# Patient Record
Sex: MALE | Race: WHITE | URBAN - METROPOLITAN AREA
[De-identification: names, ages, dates, MRNs, and addresses within clinical notes are randomized per-mention and may not be internally consistent; named-entity substitution may affect disease eponyms.]

---

## 2018-05-28 ENCOUNTER — HOSPITAL ENCOUNTER (EMERGENCY)
Facility: CLINIC | Age: 53
Discharge: HOME | End: 2018-05-28
Attending: FAMILY MEDICINE | Admitting: FAMILY MEDICINE
Payer: COMMERCIAL

## 2018-05-28 VITALS
BODY MASS INDEX: 40.63 KG/M2 | WEIGHT: 300 LBS | OXYGEN SATURATION: 97 % | SYSTOLIC BLOOD PRESSURE: 129 MMHG | HEART RATE: 94 BPM | TEMPERATURE: 98.9 F | RESPIRATION RATE: 16 BRPM | DIASTOLIC BLOOD PRESSURE: 74 MMHG | HEIGHT: 72 IN

## 2018-05-28 DIAGNOSIS — T67.2XXA HEAT CRAMPS, INITIAL ENCOUNTER: ICD-10-CM

## 2018-05-28 LAB
ALBUMIN SERPL-MCNC: 4.1 G/DL (ref 3.4–5)
ALBUMIN UR-MCNC: NEGATIVE MG/DL
ALP SERPL-CCNC: 72 U/L (ref 40–150)
ALT SERPL W P-5'-P-CCNC: 33 U/L (ref 0–70)
ANION GAP SERPL CALCULATED.3IONS-SCNC: 8 MMOL/L (ref 3–14)
APPEARANCE UR: CLEAR
AST SERPL W P-5'-P-CCNC: 22 U/L (ref 0–45)
BASOPHILS # BLD AUTO: 0 10E9/L (ref 0–0.2)
BASOPHILS NFR BLD AUTO: 0.3 %
BILIRUB SERPL-MCNC: 0.6 MG/DL (ref 0.2–1.3)
BILIRUB UR QL STRIP: NEGATIVE
BUN SERPL-MCNC: 19 MG/DL (ref 7–30)
CALCIUM SERPL-MCNC: 9 MG/DL (ref 8.5–10.1)
CHLORIDE SERPL-SCNC: 104 MMOL/L (ref 94–109)
CK SERPL-CCNC: 240 U/L (ref 30–300)
CO2 SERPL-SCNC: 25 MMOL/L (ref 20–32)
COLOR UR AUTO: NORMAL
CREAT SERPL-MCNC: 1.24 MG/DL (ref 0.66–1.25)
DIFFERENTIAL METHOD BLD: NORMAL
EOSINOPHIL # BLD AUTO: 0.1 10E9/L (ref 0–0.7)
EOSINOPHIL NFR BLD AUTO: 1 %
ERYTHROCYTE [DISTWIDTH] IN BLOOD BY AUTOMATED COUNT: 13.3 % (ref 10–15)
GFR SERPL CREATININE-BSD FRML MDRD: 61 ML/MIN/1.7M2
GLUCOSE SERPL-MCNC: 105 MG/DL (ref 70–99)
GLUCOSE UR STRIP-MCNC: NEGATIVE MG/DL
HCT VFR BLD AUTO: 43.8 % (ref 40–53)
HGB BLD-MCNC: 15 G/DL (ref 13.3–17.7)
HGB UR QL STRIP: NEGATIVE
HYALINE CASTS #/AREA URNS LPF: 1 /LPF (ref 0–2)
IMM GRANULOCYTES # BLD: 0 10E9/L (ref 0–0.4)
IMM GRANULOCYTES NFR BLD: 0.2 %
KETONES UR STRIP-MCNC: NEGATIVE MG/DL
LEUKOCYTE ESTERASE UR QL STRIP: NEGATIVE
LYMPHOCYTES # BLD AUTO: 1.5 10E9/L (ref 0.8–5.3)
LYMPHOCYTES NFR BLD AUTO: 15.9 %
MCH RBC QN AUTO: 28.7 PG (ref 26.5–33)
MCHC RBC AUTO-ENTMCNC: 34.2 G/DL (ref 31.5–36.5)
MCV RBC AUTO: 84 FL (ref 78–100)
MONOCYTES # BLD AUTO: 0.7 10E9/L (ref 0–1.3)
MONOCYTES NFR BLD AUTO: 7 %
NEUTROPHILS # BLD AUTO: 7.3 10E9/L (ref 1.6–8.3)
NEUTROPHILS NFR BLD AUTO: 75.6 %
NITRATE UR QL: NEGATIVE
PH UR STRIP: 6 PH (ref 5–7)
PLATELET # BLD AUTO: 228 10E9/L (ref 150–450)
POTASSIUM SERPL-SCNC: 3.8 MMOL/L (ref 3.4–5.3)
PROT SERPL-MCNC: 7.8 G/DL (ref 6.8–8.8)
RBC # BLD AUTO: 5.23 10E12/L (ref 4.4–5.9)
RBC #/AREA URNS AUTO: <1 /HPF (ref 0–2)
SODIUM SERPL-SCNC: 137 MMOL/L (ref 133–144)
SOURCE: NORMAL
SP GR UR STRIP: 1.01 (ref 1–1.03)
UROBILINOGEN UR STRIP-MCNC: 0 MG/DL (ref 0–2)
WBC # BLD AUTO: 9.7 10E9/L (ref 4–11)
WBC #/AREA URNS AUTO: 0 /HPF (ref 0–5)

## 2018-05-28 PROCEDURE — 80053 COMPREHEN METABOLIC PANEL: CPT | Performed by: FAMILY MEDICINE

## 2018-05-28 PROCEDURE — 96361 HYDRATE IV INFUSION ADD-ON: CPT | Performed by: FAMILY MEDICINE

## 2018-05-28 PROCEDURE — 82550 ASSAY OF CK (CPK): CPT | Performed by: FAMILY MEDICINE

## 2018-05-28 PROCEDURE — 81001 URINALYSIS AUTO W/SCOPE: CPT | Performed by: FAMILY MEDICINE

## 2018-05-28 PROCEDURE — 99283 EMERGENCY DEPT VISIT LOW MDM: CPT | Mod: 25 | Performed by: FAMILY MEDICINE

## 2018-05-28 PROCEDURE — 96360 HYDRATION IV INFUSION INIT: CPT | Performed by: FAMILY MEDICINE

## 2018-05-28 PROCEDURE — 85025 COMPLETE CBC W/AUTO DIFF WBC: CPT | Performed by: FAMILY MEDICINE

## 2018-05-28 PROCEDURE — 25000128 H RX IP 250 OP 636: Performed by: FAMILY MEDICINE

## 2018-05-28 PROCEDURE — 99282 EMERGENCY DEPT VISIT SF MDM: CPT | Mod: Z6 | Performed by: FAMILY MEDICINE

## 2018-05-28 RX ORDER — SODIUM CHLORIDE 9 MG/ML
1000 INJECTION, SOLUTION INTRAVENOUS CONTINUOUS
Status: DISCONTINUED | OUTPATIENT
Start: 2018-05-28 | End: 2018-05-28 | Stop reason: HOSPADM

## 2018-05-28 RX ADMIN — SODIUM CHLORIDE 1000 ML: 9 INJECTION, SOLUTION INTRAVENOUS at 19:13

## 2018-05-28 ASSESSMENT — ENCOUNTER SYMPTOMS
BACK PAIN: 1
SHORTNESS OF BREATH: 0
CHILLS: 0
WHEEZING: 0
DIZZINESS: 1
MYALGIAS: 1
ABDOMINAL PAIN: 0
COUGH: 0
HEADACHES: 0
NAUSEA: 0
VOMITING: 0
PALPITATIONS: 0
FEVER: 0

## 2018-05-28 NOTE — ED PROVIDER NOTES
"  History     Chief Complaint   Patient presents with     Dizziness     dizzy, muscle cramps, ha     HPI  Rian Liang is a 52 year old male with a history of insomnia who presents to the ED for evaluation of muscle cramping and dizziness. The patient states that he was working outside for the majority of today. After working for \"a while\" the patient reports that he stopped sweating and began experiencing dizziness and muscle cramping \"everywhere\". He elaborates that he is currently experiencing bilateral cramping in his calves, R>L, as well as cramping in his bilateral arms and fingers. The patient also developed a mild headache, which has now resolved. The patient reports that he has been urinating less frequently today, stating that he has not urinated since this morning. He tried to drink water today and eventually put a little sugar in with it to try to avoid dehydration. He also ate two bananas. The patient has back pain as well, but reports that it does not worsen with palpation. The patient denies having any significant medical history, stating that he has insomnia but has been \"fairly healthy\". The patient regularly takes amitriptyline as a sleep aid and occasionally uses Tylenol and ibuprofen. He also uses a CPAP daily for sleep apnea. Of note, the patient underwent a cholecystectomy in 12/2017.      Problem List:    There are no active problems to display for this patient.       Past Medical History:    No past medical history on file.    Past Surgical History:    No past surgical history on file.    Family History:    No family history on file.    Social History:  Marital Status:    Social History   Substance Use Topics     Smoking status: Not on file     Smokeless tobacco: Not on file     Alcohol use Not on file        Medications:      No current outpatient prescriptions on file.      Review of Systems   Constitutional: Negative for chills, diaphoresis and fever.   HENT: Negative for ear pain, " sinus pressure and sore throat.    Eyes: Negative for visual disturbance.   Respiratory: Negative for cough, shortness of breath and wheezing.    Cardiovascular: Negative for chest pain and palpitations.   Gastrointestinal: Negative for abdominal pain, blood in stool, constipation, diarrhea, nausea and vomiting.   Genitourinary: Negative for dysuria, frequency and urgency.   Musculoskeletal: Positive for back pain and myalgias.   Skin: Negative for rash.   Neurological: Positive for dizziness. Negative for headaches.   All other systems reviewed and are negative.      Physical Exam   BP: 146/86  Pulse: 114  Temp: 98.9  F (37.2  C)  Resp: 16  Height: 182.9 cm (6')  Weight: 136.1 kg (300 lb)  SpO2: 97 %      Physical Exam   Constitutional: He appears well-developed. No distress.   HENT:   Right Ear: External ear normal.   Left Ear: External ear normal.   Mouth/Throat: Oropharynx is clear and moist.   Eyes: Conjunctivae are normal.   Neck: Neck supple.   Cardiovascular: Normal rate, regular rhythm and normal heart sounds.  Exam reveals no gallop and no friction rub.    No murmur heard.  Pulmonary/Chest: Effort normal and breath sounds normal. No respiratory distress. He has no wheezes. He has no rales.   Abdominal: Soft. Bowel sounds are normal. He exhibits no distension. There is no tenderness. There is no rebound and no guarding.   Musculoskeletal: He exhibits no edema.   Neurological: No cranial nerve deficit. He exhibits normal muscle tone. Coordination normal.   Skin: No rash noted. He is not diaphoretic.     Neurological: Motor exam is 5/5. He has normal mentation.    ED Course     ED Course     Procedures               Critical Care time:  none               Results for orders placed or performed during the hospital encounter of 05/28/18 (from the past 24 hour(s))   CBC with platelets differential   Result Value Ref Range    WBC 9.7 4.0 - 11.0 10e9/L    RBC Count 5.23 4.4 - 5.9 10e12/L    Hemoglobin 15.0 13.3 -  17.7 g/dL    Hematocrit 43.8 40.0 - 53.0 %    MCV 84 78 - 100 fl    MCH 28.7 26.5 - 33.0 pg    MCHC 34.2 31.5 - 36.5 g/dL    RDW 13.3 10.0 - 15.0 %    Platelet Count 228 150 - 450 10e9/L    Diff Method Automated Method     % Neutrophils 75.6 %    % Lymphocytes 15.9 %    % Monocytes 7.0 %    % Eosinophils 1.0 %    % Basophils 0.3 %    % Immature Granulocytes 0.2 %    Absolute Neutrophil 7.3 1.6 - 8.3 10e9/L    Absolute Lymphocytes 1.5 0.8 - 5.3 10e9/L    Absolute Monocytes 0.7 0.0 - 1.3 10e9/L    Absolute Eosinophils 0.1 0.0 - 0.7 10e9/L    Absolute Basophils 0.0 0.0 - 0.2 10e9/L    Abs Immature Granulocytes 0.0 0 - 0.4 10e9/L   CK total   Result Value Ref Range    CK Total 240 30 - 300 U/L   Comprehensive metabolic panel   Result Value Ref Range    Sodium 137 133 - 144 mmol/L    Potassium 3.8 3.4 - 5.3 mmol/L    Chloride 104 94 - 109 mmol/L    Carbon Dioxide 25 20 - 32 mmol/L    Anion Gap 8 3 - 14 mmol/L    Glucose 105 (H) 70 - 99 mg/dL    Urea Nitrogen 19 7 - 30 mg/dL    Creatinine 1.24 0.66 - 1.25 mg/dL    GFR Estimate 61 >60 mL/min/1.7m2    GFR Estimate If Black 74 >60 mL/min/1.7m2    Calcium 9.0 8.5 - 10.1 mg/dL    Bilirubin Total 0.6 0.2 - 1.3 mg/dL    Albumin 4.1 3.4 - 5.0 g/dL    Protein Total 7.8 6.8 - 8.8 g/dL    Alkaline Phosphatase 72 40 - 150 U/L    ALT 33 0 - 70 U/L    AST 22 0 - 45 U/L       Medications   0.9% sodium chloride BOLUS (1,000 mLs Intravenous New Bag 5/28/18 1913)     Followed by   sodium chloride 0.9% infusion (not administered)       6:56 PM Patient assessed.    Assessments & Plan (with Medical Decision Making)     MDM: Rian Liang is a 52 year old male who presented with heat related illness.  Primarily he cramps with normal temperature on presentation and CK enzyme normal.  Electrolytes reassuring.  IV fluids were administered in the emergency department and he felt improved.  Precautions are given for return.    I have reviewed the nursing notes.    I have reviewed the findings,  diagnosis, plan and need for follow up with the patient.       New Prescriptions    No medications on file       Final diagnoses:   Heat cramps, initial encounter - No signs of serious findings.  avoid excessive heat exposure in the next fewe days. stay hydrated. return for recurrent symptoms     This document serves as a record of the services and decisions personally performed and made by Paresh Martinez MD. It was created on HIS/HER behalf by Ngoc Colmenares, a trained medical scribe. The creation of this document is based the provider's statements to the medical scribe.  Ngoc Colmenares 7:02 PM 5/28/2018    Provider:   The information in this document, created by the medical scribe for me, accurately reflects the services I personally performed and the decisions made by me. I have reviewed and approved this document for accuracy prior to leaving the patient care area.  Paresh Martinez MD 7:02 PM 5/28/2018 5/28/2018   [facility] EMERGENCY DEPARTMENT     Paresh Martinez MD  05/29/18 0038

## 2018-05-28 NOTE — ED AVS SNAPSHOT
[facility] Emergency Department    [address] [address]    Phone:  [phone]    Fax:  [phone]                                       Rian Liang   MRN: [id number]    Department:  [facility] Emergency Department   Date of Visit:  5/28/2018           Patient Information     Date Of Birth          1965        Your diagnoses for this visit were:     Heat cramps, initial encounter No signs of serious findings.  avoid excessive heat exposure in the next fewe days. stay hydrated. return for recurrent symptoms       You were seen by Paresh Martinez MD.      Follow-up Information     Follow up with No Ref-Primary, Physician In 1 week.        Follow up with [facility] Emergency Department.    Specialty:  EMERGENCY MEDICINE    Why:  As needed, If symptoms worsen    Contact information:    [address] [place] [id number] [phone]    Additional information:    The medical center is located at   [address] (between [facility] and   [facility] in [place], four miles north   of [place]).        Discharge Instructions         ICD-10-CM    1. Heat cramps, initial encounter T67.2XXA UA with Microscopic    No signs of serious findings.  avoid excessive heat exposure in the next fewe days. stay hydrated. return for recurrent symptoms         Heat Cramps  Heat cramps are muscle cramps caused by intense exercise. They often occur in the heat, but can also occur in cooler temperatures. They are sometimes called  exercise-associated muscle cramps.  Symptoms include cramping of the muscles that comes on suddenly and causes severe pain. Cramping may last from minutes to hours. The cramps are thought to be caused by dehydration and loss of minerals in the body due to excessive sweating, but other factors can be involved. They are more common in people who are not used to heavy exercise or who are not used to exercising in hot or humid temperatures.  Heat cramps  are treated by moving to a cool place, stretching the muscle, and drinking fluids containing salt. Severe heat cramps may need to be treated using IV (intravenous) fluids.  Home care    If cramping continues, drink electrolyte solution, sports drinks, or water with 2 teaspoons of salt per 8 ounces.     Avoid alcohol and caffeine.    Stretch and massage the painful muscle.  Preventing heat cramps:    Stay hydrated during exercise. Drink plenty of fluids containing electrolytes, such as sports drinks. If you are prone to heat cramps, add an extra 1/2 teaspoon of salt to each 8 ounces of sports drink.    Work up gradually to exercising in hot or very humid conditions. Limit exercise on very hot days.    Avoid alcohol and caffeine before and during exercise.  Preventing other heat illness:    Protect yourself from the heat. Wear lightweight, light-colored clothing and a broad-brimmed hat.    Drink plenty of fluids before and during activity. Avoid alcohol and caffeine.    Limit exercise on hot or very humid days. If you have to be active in the heat, take frequent breaks to drink fluids and cool down.    Don't exercise when you're feeling ill.    Watch for symptoms of heat illness such as extreme tiredness, excess sweating, and dizziness. If any occur, move to a cool place, rest, and drink cool fluids. Lying down with your legs raised slightly can help you recover.  Follow-up care  Follow up with your healthcare provider, or as advised.  When to seek medical advice  Call your healthcare provider right away if any of these occur:    Inability to keep fluids down    Vomiting or diarrhea    Hot flushed skin    Worsening symptoms or new symptoms  Call 911  Call 911 for any of these symptoms of severe heat illness:    Confusion    Irrational behavior    Hallucinations    Trouble walking    Seizures    Passing out    Fever of 104 F (40 C) or higher  Date Last Reviewed: 5/1/2017 2000-2017 [employer] [address]  [place] [address]. All rights reserved. This information is not intended as a substitute for professional medical care. Always follow your healthcare professional's instructions.          24 Hour Appointment Hotline       To make an appointment at any [facility], call [phone] ([phone]). If you don't have a family doctor or clinic, we will help you find one. [facility] are conveniently located to serve the needs of you and your family.             Review of your medicines      Notice     You have not been prescribed any medications.            Procedures and tests performed during your visit     CBC with platelets differential    CK total    Comprehensive metabolic panel    UA with Microscopic      Orders Needing Specimen Collection     None      Pending Results     No orders found from 5/26/2018 to 5/29/2018.            Pending Culture Results     No orders found from 5/26/2018 to 5/29/2018.            Pending Results Instructions     If you had any lab results that were not finalized at the time of your Discharge, you can call the ED Lab Result RN at [phone]. You will be contacted by this team for any positive Lab results or changes in treatment. The nurses are available 7 days a week from 10A to 6:30P.  You can leave a message 24 hours per day and they will return your call.        Test Results From Your Hospital Stay        5/28/2018  7:23 PM      Component Results     Component Value Ref Range & Units Status    WBC 9.7 4.0 - 11.0 10e9/L Final    RBC Count 5.23 4.4 - 5.9 10e12/L Final    Hemoglobin 15.0 13.3 - 17.7 g/dL Final    Hematocrit 43.8 40.0 - 53.0 % Final    MCV 84 78 - 100 fl Final    MCH 28.7 26.5 - 33.0 pg Final    MCHC 34.2 31.5 - 36.5 g/dL Final    RDW 13.3 10.0 - 15.0 % Final    Platelet Count 228 150 - 450 10e9/L Final    Diff Method Automated Method  Final    % Neutrophils 75.6 % Final    % Lymphocytes 15.9 % Final    % Monocytes 7.0 % Final    %  Eosinophils 1.0 % Final    % Basophils 0.3 % Final    % Immature Granulocytes 0.2 % Final    Absolute Neutrophil 7.3 1.6 - 8.3 10e9/L Final    Absolute Lymphocytes 1.5 0.8 - 5.3 10e9/L Final    Absolute Monocytes 0.7 0.0 - 1.3 10e9/L Final    Absolute Eosinophils 0.1 0.0 - 0.7 10e9/L Final    Absolute Basophils 0.0 0.0 - 0.2 10e9/L Final    Abs Immature Granulocytes 0.0 0 - 0.4 10e9/L Final         5/28/2018  8:53 PM      Component Results     Component Value Ref Range & Units Status    Color Urine Straw  Final    Appearance Urine Clear  Final    Glucose Urine Negative NEG^Negative mg/dL Final    Bilirubin Urine Negative NEG^Negative Final    Ketones Urine Negative NEG^Negative mg/dL Final    Specific Gravity Urine 1.012 1.003 - 1.035 Final    Blood Urine Negative NEG^Negative Final    pH Urine 6.0 5.0 - 7.0 pH Final    Protein Albumin Urine Negative NEG^Negative mg/dL Final    Urobilinogen mg/dL 0.0 0.0 - 2.0 mg/dL Final    Nitrite Urine Negative NEG^Negative Final    Leukocyte Esterase Urine Negative NEG^Negative Final    Source Midstream Urine  Final    WBC Urine 0 0 - 5 /HPF Final    RBC Urine <1 0 - 2 /HPF Final    Hyaline Casts 1 0 - 2 /LPF Final         5/28/2018  7:34 PM      Component Results     Component Value Ref Range & Units Status    CK Total 240 30 - 300 U/L Final         5/28/2018  7:34 PM      Component Results     Component Value Ref Range & Units Status    Sodium 137 133 - 144 mmol/L Final    Potassium 3.8 3.4 - 5.3 mmol/L Final    Chloride 104 94 - 109 mmol/L Final    Carbon Dioxide 25 20 - 32 mmol/L Final    Anion Gap 8 3 - 14 mmol/L Final    Glucose 105 (H) 70 - 99 mg/dL Final    Urea Nitrogen 19 7 - 30 mg/dL Final    Creatinine 1.24 0.66 - 1.25 mg/dL Final    GFR Estimate 61 >60 mL/min/1.7m2 Final    Non  GFR Calc    GFR Estimate If Black 74 >60 mL/min/1.7m2 Final    African American GFR Calc    Calcium 9.0 8.5 - 10.1 mg/dL Final    Bilirubin Total 0.6 0.2 - 1.3 mg/dL Final     "Albumin 4.1 3.4 - 5.0 g/dL Final    Protein Total 7.8 6.8 - 8.8 g/dL Final    Alkaline Phosphatase 72 40 - 150 U/L Final    ALT 33 0 - 70 U/L Final    AST 22 0 - 45 U/L Final                Thank you for choosing [facility]       Thank you for choosing [facility] for your care. Our goal is always to provide you with excellent care. Hearing back from our patients is one way we can continue to improve our services. Please take a few minutes to complete the written survey that you may receive in the mail after you visit with us. Thank you!        [employer] Information     [employer] lets you send messages to your doctor, view your test results, renew your prescriptions, schedule appointments and more. To sign up, go to [email or web address] . Click on \"Log in\" on the left side of the screen, which will take you to the Welcome page. Then click on \"Sign up Now\" on the right side of the page.     You will be asked to enter the access code listed below, as well as some personal information. Please follow the directions to create your username and password.     Your access code is: [email or web address]  Expires: 2018  8:59 PM     Your access code will  in 90 days. If you need help or a new code, please call your [facility] clinic or [phone].        Care EveryWhere ID     This is your Care EveryWhere ID. This could be used by other organizations to access your [facility] medical records  [id number]        Equal Access to Services     GILA FONTENOT AH: Hadii jimbo ku hadasho Soomaali, waaxda luqadaha, qaybta kaalmada adeegyada, waxay jerri howard . So [facility] [phone].    ATENCIÓN: Si habla español, tiene a marie disposición servicios gratuitos de asistencia lingüística. Llame al [phone].    We comply with applicable federal civil rights laws and [place] laws. We do not discriminate on the basis of race, color, national origin, age, disability, sex, sexual orientation, or gender identity.            After " Visit Summary       This is your record. Keep this with you and show to your community pharmacist(s) and doctor(s) at your next visit.

## 2018-05-28 NOTE — ED AVS SNAPSHOT
[facility] Emergency Department    [address] [address]    Phone:  [phone]    Fax:  [phone]                                       Rian Liang   MRN: [id number]    Department:  [facility] Emergency Department   Date of Visit:  5/28/2018           After Visit Summary Signature Page     I have received my discharge instructions, and my questions have been answered. I have discussed any challenges I see with this plan with the nurse or doctor.    ..........................................................................................................................................  Patient/Patient Representative Signature      ..........................................................................................................................................  Patient Representative Print Name and Relationship to Patient    ..................................................               ................................................  Date                                            Time    ..........................................................................................................................................  Reviewed by Signature/Title    ...................................................              ..............................................  Date                                                            Time

## 2018-05-29 ASSESSMENT — ENCOUNTER SYMPTOMS
FREQUENCY: 0
DYSURIA: 0
DIAPHORESIS: 0
CONSTIPATION: 0
BLOOD IN STOOL: 0
SORE THROAT: 0
DIARRHEA: 0
SINUS PRESSURE: 0

## 2018-05-29 NOTE — DISCHARGE INSTRUCTIONS
ICD-10-CM    1. Heat cramps, initial encounter T67.2XXA UA with Microscopic    No signs of serious findings.  avoid excessive heat exposure in the next fewe days. stay hydrated. return for recurrent symptoms         Heat Cramps  Heat cramps are muscle cramps caused by intense exercise. They often occur in the heat, but can also occur in cooler temperatures. They are sometimes called  exercise-associated muscle cramps.  Symptoms include cramping of the muscles that comes on suddenly and causes severe pain. Cramping may last from minutes to hours. The cramps are thought to be caused by dehydration and loss of minerals in the body due to excessive sweating, but other factors can be involved. They are more common in people who are not used to heavy exercise or who are not used to exercising in hot or humid temperatures.  Heat cramps are treated by moving to a cool place, stretching the muscle, and drinking fluids containing salt. Severe heat cramps may need to be treated using IV (intravenous) fluids.  Home care    If cramping continues, drink electrolyte solution, sports drinks, or water with 2 teaspoons of salt per 8 ounces.     Avoid alcohol and caffeine.    Stretch and massage the painful muscle.  Preventing heat cramps:    Stay hydrated during exercise. Drink plenty of fluids containing electrolytes, such as sports drinks. If you are prone to heat cramps, add an extra 1/2 teaspoon of salt to each 8 ounces of sports drink.    Work up gradually to exercising in hot or very humid conditions. Limit exercise on very hot days.    Avoid alcohol and caffeine before and during exercise.  Preventing other heat illness:    Protect yourself from the heat. Wear lightweight, light-colored clothing and a broad-brimmed hat.    Drink plenty of fluids before and during activity. Avoid alcohol and caffeine.    Limit exercise on hot or very humid days. If you have to be active in the heat, take frequent breaks to drink fluids and  cool down.    Don't exercise when you're feeling ill.    Watch for symptoms of heat illness such as extreme tiredness, excess sweating, and dizziness. If any occur, move to a cool place, rest, and drink cool fluids. Lying down with your legs raised slightly can help you recover.  Follow-up care  Follow up with your healthcare provider, or as advised.  When to seek medical advice  Call your healthcare provider right away if any of these occur:    Inability to keep fluids down    Vomiting or diarrhea    Hot flushed skin    Worsening symptoms or new symptoms  Call 911  Call 911 for any of these symptoms of severe heat illness:    Confusion    Irrational behavior    Hallucinations    Trouble walking    Seizures    Passing out    Fever of 104 F (40 C) or higher  Date Last Reviewed: 5/1/2017 2000-2017 [employer]. [address] [place] [address], [place] [address]. All rights reserved. This information is not intended as a substitute for professional medical care. Always follow your healthcare professional's instructions.